# Patient Record
Sex: MALE | Race: WHITE | NOT HISPANIC OR LATINO | Employment: UNEMPLOYED | URBAN - NONMETROPOLITAN AREA
[De-identification: names, ages, dates, MRNs, and addresses within clinical notes are randomized per-mention and may not be internally consistent; named-entity substitution may affect disease eponyms.]

---
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
## 2021-02-01 ENCOUNTER — APPOINTMENT (OUTPATIENT)
Dept: GENERAL RADIOLOGY | Facility: HOSPITAL | Age: 21
End: 2021-02-01

## 2021-02-01 ENCOUNTER — APPOINTMENT (OUTPATIENT)
Dept: CT IMAGING | Facility: HOSPITAL | Age: 21
End: 2021-02-01

## 2021-02-01 ENCOUNTER — HOSPITAL ENCOUNTER (EMERGENCY)
Facility: HOSPITAL | Age: 21
Discharge: HOME | End: 2021-02-01
Attending: FAMILY MEDICINE | Admitting: FAMILY MEDICINE

## 2021-02-01 VITALS
WEIGHT: 245 LBS | HEART RATE: 90 BPM | HEIGHT: 69 IN | RESPIRATION RATE: 20 BRPM | TEMPERATURE: 98.4 F | DIASTOLIC BLOOD PRESSURE: 77 MMHG | SYSTOLIC BLOOD PRESSURE: 119 MMHG | BODY MASS INDEX: 36.29 KG/M2 | OXYGEN SATURATION: 100 %

## 2021-02-01 DIAGNOSIS — U07.1 COVID-19: Primary | ICD-10-CM

## 2021-02-01 DIAGNOSIS — J18.9 PNEUMONIA DUE TO INFECTIOUS ORGANISM, UNSPECIFIED LATERALITY, UNSPECIFIED PART OF LUNG: ICD-10-CM

## 2021-02-01 LAB
ALBUMIN SERPL-MCNC: 4.47 G/DL (ref 3.5–5.2)
ALBUMIN/GLOB SERPL: 1.3 G/DL
ALP SERPL-CCNC: 98 U/L (ref 39–117)
ALT SERPL W P-5'-P-CCNC: 54 U/L (ref 1–41)
ANION GAP SERPL CALCULATED.3IONS-SCNC: 8.8 MMOL/L (ref 5–15)
AST SERPL-CCNC: 10 U/L (ref 1–40)
BASOPHILS # BLD AUTO: 0.06 10*3/MM3 (ref 0–0.2)
BASOPHILS NFR BLD AUTO: 1.5 % (ref 0–1.5)
BILIRUB SERPL-MCNC: 0.3 MG/DL (ref 0–1.2)
BUN SERPL-MCNC: 9 MG/DL (ref 6–20)
BUN/CREAT SERPL: 10.2 (ref 7–25)
CALCIUM SPEC-SCNC: 9.1 MG/DL (ref 8.6–10.5)
CHLORIDE SERPL-SCNC: 101 MMOL/L (ref 98–107)
CO2 SERPL-SCNC: 28.2 MMOL/L (ref 22–29)
CREAT SERPL-MCNC: 0.88 MG/DL (ref 0.76–1.27)
CRP SERPL-MCNC: <0.3 MG/DL (ref 0–0.5)
D DIMER PPP FEU-MCNC: 0.51 MCGFEU/ML (ref 0–0.5)
D-LACTATE SERPL-SCNC: 0.9 MMOL/L (ref 0.5–2)
DEPRECATED RDW RBC AUTO: 39.5 FL (ref 37–54)
EOSINOPHIL # BLD AUTO: 0.22 10*3/MM3 (ref 0–0.4)
EOSINOPHIL NFR BLD AUTO: 5.4 % (ref 0.3–6.2)
ERYTHROCYTE [DISTWIDTH] IN BLOOD BY AUTOMATED COUNT: 11.9 % (ref 12.3–15.4)
FERRITIN SERPL-MCNC: 202.3 NG/ML (ref 30–400)
FLUAV RNA RESP QL NAA+PROBE: NOT DETECTED
FLUBV RNA RESP QL NAA+PROBE: NOT DETECTED
GFR SERPL CREATININE-BSD FRML MDRD: 112 ML/MIN/1.73
GLOBULIN UR ELPH-MCNC: 3.4 GM/DL
GLUCOSE SERPL-MCNC: 98 MG/DL (ref 65–99)
HCT VFR BLD AUTO: 52.9 % (ref 37.5–51)
HGB BLD-MCNC: 18.2 G/DL (ref 13–17.7)
HOLD SPECIMEN: NORMAL
HOLD SPECIMEN: NORMAL
IMM GRANULOCYTES # BLD AUTO: 0.01 10*3/MM3 (ref 0–0.05)
IMM GRANULOCYTES NFR BLD AUTO: 0.2 % (ref 0–0.5)
INR PPP: 0.94 (ref 0.9–1.1)
LDH SERPL-CCNC: 304 U/L (ref 135–225)
LYMPHOCYTES # BLD AUTO: 1.88 10*3/MM3 (ref 0.7–3.1)
LYMPHOCYTES NFR BLD AUTO: 46.3 % (ref 19.6–45.3)
MCH RBC QN AUTO: 30.7 PG (ref 26.6–33)
MCHC RBC AUTO-ENTMCNC: 34.4 G/DL (ref 31.5–35.7)
MCV RBC AUTO: 89.2 FL (ref 79–97)
MONOCYTES # BLD AUTO: 0.75 10*3/MM3 (ref 0.1–0.9)
MONOCYTES NFR BLD AUTO: 18.5 % (ref 5–12)
NEUTROPHILS NFR BLD AUTO: 1.14 10*3/MM3 (ref 1.7–7)
NEUTROPHILS NFR BLD AUTO: 28.1 % (ref 42.7–76)
NRBC BLD AUTO-RTO: 0 /100 WBC (ref 0–0.2)
NT-PROBNP SERPL-MCNC: <5 PG/ML (ref 0–450)
PLATELET # BLD AUTO: 298 10*3/MM3 (ref 140–450)
PMV BLD AUTO: 9.5 FL (ref 6–12)
POTASSIUM SERPL-SCNC: 4.1 MMOL/L (ref 3.5–5.2)
PROT SERPL-MCNC: 7.9 G/DL (ref 6–8.5)
PROTHROMBIN TIME: 12.3 SECONDS (ref 11.9–14.1)
QT INTERVAL: 352 MS
QTC INTERVAL: 432 MS
RBC # BLD AUTO: 5.93 10*6/MM3 (ref 4.14–5.8)
SARS-COV-2 RNA RESP QL NAA+PROBE: DETECTED
SODIUM SERPL-SCNC: 138 MMOL/L (ref 136–145)
TROPONIN T SERPL-MCNC: <0.01 NG/ML (ref 0–0.03)
WBC # BLD AUTO: 4.06 10*3/MM3 (ref 3.4–10.8)
WHOLE BLOOD HOLD SPECIMEN: NORMAL
WHOLE BLOOD HOLD SPECIMEN: NORMAL

## 2021-02-01 PROCEDURE — 85379 FIBRIN DEGRADATION QUANT: CPT | Performed by: FAMILY MEDICINE

## 2021-02-01 PROCEDURE — 25010000002 IOPAMIDOL 61 % SOLUTION: Performed by: FAMILY MEDICINE

## 2021-02-01 PROCEDURE — 86140 C-REACTIVE PROTEIN: CPT | Performed by: FAMILY MEDICINE

## 2021-02-01 PROCEDURE — 83605 ASSAY OF LACTIC ACID: CPT | Performed by: FAMILY MEDICINE

## 2021-02-01 PROCEDURE — 85025 COMPLETE CBC W/AUTO DIFF WBC: CPT | Performed by: FAMILY MEDICINE

## 2021-02-01 PROCEDURE — 82728 ASSAY OF FERRITIN: CPT | Performed by: FAMILY MEDICINE

## 2021-02-01 PROCEDURE — 83615 LACTATE (LD) (LDH) ENZYME: CPT | Performed by: FAMILY MEDICINE

## 2021-02-01 PROCEDURE — 93010 ELECTROCARDIOGRAM REPORT: CPT | Performed by: INTERNAL MEDICINE

## 2021-02-01 PROCEDURE — 71275 CT ANGIOGRAPHY CHEST: CPT

## 2021-02-01 PROCEDURE — 80053 COMPREHEN METABOLIC PANEL: CPT | Performed by: FAMILY MEDICINE

## 2021-02-01 PROCEDURE — 93005 ELECTROCARDIOGRAM TRACING: CPT | Performed by: FAMILY MEDICINE

## 2021-02-01 PROCEDURE — 84484 ASSAY OF TROPONIN QUANT: CPT | Performed by: FAMILY MEDICINE

## 2021-02-01 PROCEDURE — 99284 EMERGENCY DEPT VISIT MOD MDM: CPT

## 2021-02-01 PROCEDURE — 85610 PROTHROMBIN TIME: CPT | Performed by: FAMILY MEDICINE

## 2021-02-01 PROCEDURE — 87636 SARSCOV2 & INF A&B AMP PRB: CPT | Performed by: FAMILY MEDICINE

## 2021-02-01 PROCEDURE — 71045 X-RAY EXAM CHEST 1 VIEW: CPT | Performed by: RADIOLOGY

## 2021-02-01 PROCEDURE — 71045 X-RAY EXAM CHEST 1 VIEW: CPT

## 2021-02-01 PROCEDURE — 71275 CT ANGIOGRAPHY CHEST: CPT | Performed by: RADIOLOGY

## 2021-02-01 PROCEDURE — 83880 ASSAY OF NATRIURETIC PEPTIDE: CPT | Performed by: FAMILY MEDICINE

## 2021-02-01 RX ORDER — SODIUM CHLORIDE 0.9 % (FLUSH) 0.9 %
10 SYRINGE (ML) INJECTION AS NEEDED
Status: DISCONTINUED | OUTPATIENT
Start: 2021-02-01 | End: 2021-02-01 | Stop reason: HOSPADM

## 2021-02-01 RX ORDER — CEFDINIR 300 MG/1
300 CAPSULE ORAL 2 TIMES DAILY
Qty: 14 CAPSULE | Refills: 0 | Status: SHIPPED | OUTPATIENT
Start: 2021-02-01

## 2021-02-01 RX ORDER — DOXYCYCLINE 100 MG/1
100 CAPSULE ORAL 2 TIMES DAILY
Qty: 20 CAPSULE | Refills: 0 | Status: SHIPPED | OUTPATIENT
Start: 2021-02-01

## 2021-02-01 RX ADMIN — IOPAMIDOL 80 ML: 612 INJECTION, SOLUTION INTRAVENOUS at 13:24

## 2021-02-01 NOTE — ED NOTES
Pt gave verbal consent for CT with contrast due to isolation precautions, consent witnessed by CHRISTI Tovar.      Renita Ariza, RN  02/01/21 6424

## 2021-02-01 NOTE — ED NOTES
Pt d/c'd all questions answered no acute distress noted skin warm pink and dry      Cassie Gonzalez RN  02/01/21 6284

## 2021-02-01 NOTE — ED PROVIDER NOTES
Subjective   20-year-old male with a history of anxiety presents to the emergency room with URI symptoms.  Patient states he had a cough for the past couple days.  He states cough is nonproductive.  He states he had sick contacts through coworkers who had coronavirus as well as flu.  He states he had subjective fever which is taken over-the-counter medications for.  He denies nausea vomiting.  He states he had loss of taste.  Reports generalized body aches as well as a headache.  Denies neck pain.  He denies focal weakness numbness.  Denies abdominal pain denies chest pain.  Due to ongoing symptoms came to emergency for evaluation.      Cough  Cough characteristics:  Non-productive  Sputum characteristics:  Nondescript  Timing:  Constant  Chronicity:  New  Relieved by:  Nothing  Worsened by:  Nothing  Associated symptoms: chills, fever, headaches and myalgias    Associated symptoms: no chest pain, no diaphoresis, no rash, no rhinorrhea, no shortness of breath, no sinus congestion, no sore throat and no wheezing        Review of Systems   Constitutional: Positive for chills and fever. Negative for diaphoresis.   HENT: Negative for rhinorrhea and sore throat.    Respiratory: Positive for cough. Negative for shortness of breath and wheezing.    Cardiovascular: Negative for chest pain.   Musculoskeletal: Positive for myalgias.   Skin: Negative for rash.   Neurological: Positive for headaches.   All other systems reviewed and are negative.      Past Medical History:   Diagnosis Date   • ADHD (attention deficit hyperactivity disorder)    • Anxiety    • Bipolar disorder (CMS/HCC)    • Depression    • Mood disorder (CMS/HCC)    • Oppositional defiant disorder    • Psychiatric illness        No Known Allergies    History reviewed. No pertinent surgical history.    Family History   Problem Relation Age of Onset   • Anxiety disorder Mother    • ADD / ADHD Mother    • Depression Maternal Grandfather        Social History      Socioeconomic History   • Marital status: Single     Spouse name: Not on file   • Number of children: Not on file   • Years of education: Not on file   • Highest education level: Not on file   Tobacco Use   • Smoking status: Never Smoker   • Smokeless tobacco: Never Used   Substance and Sexual Activity   • Alcohol use: No   • Drug use: No   • Sexual activity: Never           Objective   Physical Exam  Vitals signs and nursing note reviewed.   Constitutional:       Appearance: He is not ill-appearing or diaphoretic.   HENT:      Head: Normocephalic and atraumatic.      Comments: Moist mucous membranes uvula midline.  No oral lesions.  Clear tympanic membranes no mastoid tenderness.  Eyes:      Conjunctiva/sclera: Conjunctivae normal.      Pupils: Pupils are equal, round, and reactive to light.   Neck:      Musculoskeletal: Neck supple.      Comments: No nuchal rigidity  Cardiovascular:      Rate and Rhythm: Normal rate and regular rhythm.      Comments: No murmur 2+ radial pulse 2+ dorsalis pedis pulses no systoles  Pulmonary:      Comments: Symmetric chest wall movement no excess muscle use speaks in full sentences no rhonchi rales or wheezes.  Abdominal:      General: Bowel sounds are normal.      Palpations: Abdomen is soft.      Tenderness: There is no abdominal tenderness. There is no guarding.      Comments: No guarding rigidity no abdominal bruit.   Musculoskeletal: Normal range of motion.   Skin:     General: Skin is warm and dry.      Capillary Refill: Capillary refill takes less than 2 seconds.   Neurological:      Mental Status: He is alert and oriented to person, place, and time.      Cranial Nerves: No cranial nerve deficit.      Sensory: No sensory deficit.   Psychiatric:         Mood and Affect: Mood normal.         Procedures           ED Course  ED Course as of Feb 01 1413   Mon Feb 01, 2021   1045 Patient's white blood cell count unremarkable hemoglobin 18.2 hematocrit 52.9 patient did have  elevated D-dimer at 0.51 have ordered CT PE protocol.  EKG shows normal sinus rhythm rate 91 parable 162 QRS 84  normal axis no ST elevation.    [BB]   1103 Patient Covid positive.    [BB]   1109 CMP is unremarkable LDH elevated 304 CRP unremarkable troponin ferritin unremarkable.    [BB]   1110 Chest x-ray is unremarkable    [BB]   1411 Patient CT PE protocol negative for PE however is noted patchy infiltrate patient to be started on oral antibiotics.  I discussed need for follow-up with family doctor as needed discussed warning signs symptoms that warrant return to emergency room.  Patient is afebrile vital signs are stable.  He is in no respiratory distress.  Have discussed quarantine for 14 days    [BB]      ED Course User Index  [BB] Cliff Brennan MD                                           [facility]    Final diagnoses:   COVID-19   Pneumonia due to infectious organism, unspecified laterality, unspecified part of lung            Cliff Brennan MD  02/01/21 1300

## 2021-02-01 NOTE — DISCHARGE INSTRUCTIONS
Call one of the offices below to establish a primary care provider.  If you are unable to get an appointment and feel it is an emergency and need to be seen immediately please return to the Emergency Department.    Call one of the office below to set up a primary care provider.    Dr. Emmanuel Carnes                                                                                                       [address] [facility] [address]  [phone]    Dr. Daniel, Dr. DEANDRE Lafleur, Dr. BOBBY Lafleur ([facility])  [address] [facility] [address]  [phone]    Dr. Wallace, Dr. Burks, Dr. Augustin ([facility])  [address] [facility] [address]  [phone]    Dr. Kapadia  [address] [facility] [address]  [phone]    Dr. Snyder, Dr. Wong, Dr. Tsang, Dr. Diane ([facility])  [address] [place] [address] [address]  [facility] [address]  [phone]    Dr. Nighat Keller  [address] [facility] [place] [address]  [phone]    Dr. Karla Heck  [address]   [address]  [facility] [address]  [phone]    Dr. Carnes  [address] [facility] [address]  [phone]    Dr. Nath, Dr. Hurley  [address] [facility] [place] [address]  [phone]    Dr. Mark  [address]                                                              [address]  [facility] [address]  [phone]    Dr. Lauren  [address] [facility] [id number] [phone]    Dr. Zohreh Levine  [address]  [address]  [facility] [address]  [phone]

## 2021-02-03 ENCOUNTER — PATIENT OUTREACH (OUTPATIENT)
Dept: CASE MANAGEMENT | Facility: OTHER | Age: 21
End: 2021-02-03

## 2021-02-03 NOTE — OUTREACH NOTE
"ED Potential Covid Discharge Follow-up    Patient was seen in the ED 2/1/21 with complaints of cough and loss of taste. Patient tested positive for Covid-19. Patient and his sister live together and were both diagnosed with the virus. Patient had been prescribed cefdinir and doxycyline but states these were provided to him on paper prescriptions and he does not have anyone to fill these for him as he is now self-quarantined. RN-ACM asked if patient's local health department had contacted him yet, patient states \"They sent our names to the wrong HD.\" Patient states he lives in [facility]. ACM encouraged patient to contact the [facility] and let them know he needs assistance in filling/obtaining his prescribed medications as the  has been arranging this for patients in self-quarantine as needed, pt v/u. ACM instructed on 24/7 nurse line as well as Covid Hotline number, pt v/u.     Nanette Loja RN  Ambulatory     2/3/2021, 10:37 EST      "

## 2021-03-15 ENCOUNTER — BULK ORDERING (OUTPATIENT)
Dept: CASE MANAGEMENT | Facility: OTHER | Age: 21
End: 2021-03-15

## 2021-03-15 DIAGNOSIS — Z23 IMMUNIZATION DUE: ICD-10-CM

## 2024-01-23 ENCOUNTER — HOSPITAL ENCOUNTER (EMERGENCY)
Facility: HOSPITAL | Age: 24
Discharge: HOME | End: 2024-01-23
Attending: EMERGENCY MEDICINE | Admitting: EMERGENCY MEDICINE
Payer: COMMERCIAL

## 2024-01-23 VITALS
HEIGHT: 69 IN | SYSTOLIC BLOOD PRESSURE: 141 MMHG | TEMPERATURE: 98.1 F | HEART RATE: 72 BPM | WEIGHT: 232 LBS | OXYGEN SATURATION: 99 % | RESPIRATION RATE: 18 BRPM | DIASTOLIC BLOOD PRESSURE: 95 MMHG | BODY MASS INDEX: 34.36 KG/M2

## 2024-01-23 DIAGNOSIS — L02.811 CELLULITIS AND ABSCESS OF HEAD: Primary | ICD-10-CM

## 2024-01-23 DIAGNOSIS — L03.811 CELLULITIS AND ABSCESS OF HEAD: Primary | ICD-10-CM

## 2024-01-23 PROCEDURE — 99283 EMERGENCY DEPT VISIT LOW MDM: CPT

## 2024-01-23 RX ORDER — SULFAMETHOXAZOLE AND TRIMETHOPRIM 800; 160 MG/1; MG/1
2 TABLET ORAL ONCE
Status: COMPLETED | OUTPATIENT
Start: 2024-01-23 | End: 2024-01-23

## 2024-01-23 RX ORDER — SULFAMETHOXAZOLE AND TRIMETHOPRIM 800; 160 MG/1; MG/1
2 TABLET ORAL 2 TIMES DAILY
Qty: 40 TABLET | Refills: 0 | Status: SHIPPED | OUTPATIENT
Start: 2024-01-23

## 2024-01-23 RX ADMIN — SULFAMETHOXAZOLE AND TRIMETHOPRIM 2 TABLET: 800; 160 TABLET ORAL at 05:36

## 2024-01-23 NOTE — DISCHARGE INSTRUCTIONS
Antibiotics as prescribed.  Drink plenty of water with this antibiotic.  Warm moist compresses frequently throughout the day as we discussed.  Call patient connection [facility] today to schedule first available follow-up visit with primary care.  Return to the emergency department right away if symptoms worsen/any problems.

## 2024-01-23 NOTE — ED PROVIDER NOTES
"Subjective   History of Present Illness  Patient is a 23-year-old male who presents complaining of a swollen red area on the right side of his face, on his cheek.  He states that this started Wednesday of last week, seem to get better but seems to be worsening now once again.  He denies any injury.  He denies any other symptoms or other complaints.  Patient has a psychiatric history and formerly was on some psychiatric medications, including Abilify, Depakote, guanfacine and trazodone.  He states that he quit taking all of this \"a good while ago\", states that he has been doing well and not having any psychiatric issues.  He denies any suicidal thoughts, homicidal thoughts, auditory or visual hallucinations.      Review of Systems   All other systems reviewed and are negative.      Past Medical History:   Diagnosis Date    ADHD (attention deficit hyperactivity disorder)     Anxiety     Bipolar disorder     Depression     Mood disorder     Oppositional defiant disorder     Psychiatric illness        No Known Allergies    History reviewed. No pertinent surgical history.    Family History   Problem Relation Age of Onset    Anxiety disorder Mother     ADD / ADHD Mother     Depression Maternal Grandfather        Social History     Socioeconomic History    Marital status: Single   Tobacco Use    Smoking status: Never    Smokeless tobacco: Never   Substance and Sexual Activity    Alcohol use: No    Drug use: No    Sexual activity: Never           Objective   Physical Exam  Vitals and nursing note reviewed.   Constitutional:       General: He is not in acute distress.     Appearance: Normal appearance. He is well-developed. He is not ill-appearing, toxic-appearing or diaphoretic.      Comments: Well-developed well-nourished male, alert and well-oriented, in no apparent acute distress.   HENT:      Head: Normocephalic and atraumatic.      Comments: Right maxillary skin has an approximately 1 to 1-1/2 x 3 cm of redness and " induration.  At this point this does not require incision and drainage.  Eyes:      General: No scleral icterus.     Pupils: Pupils are equal, round, and reactive to light.   Neck:      Trachea: No tracheal deviation.   Cardiovascular:      Rate and Rhythm: Normal rate and regular rhythm.   Pulmonary:      Effort: Pulmonary effort is normal. No respiratory distress.      Breath sounds: Normal breath sounds.   Chest:      Chest wall: No tenderness.   Abdominal:      General: Bowel sounds are normal.      Palpations: Abdomen is soft.      Tenderness: There is no abdominal tenderness. There is no guarding or rebound.   Musculoskeletal:         General: No tenderness. Normal range of motion.      Cervical back: Normal range of motion and neck supple. No rigidity or tenderness.      Right lower leg: No edema.      Left lower leg: No edema.   Skin:     General: Skin is warm and dry.      Capillary Refill: Capillary refill takes less than 2 seconds.      Coloration: Skin is not pale.   Neurological:      General: No focal deficit present.      Mental Status: He is alert and oriented to person, place, and time.      GCS: GCS eye subscore is 4. GCS verbal subscore is 5. GCS motor subscore is 6.      Motor: No abnormal muscle tone.   Psychiatric:         Mood and Affect: Mood normal.         Behavior: Behavior normal.         Procedures           ED Course  ED Course as of 01/23/24 0559   Tue Jan 23, 2024   0543 Patient and I discussed his situation.  We are going to treat this with antibiotics and warm moist compresses.  I would prefer not to have to incise and drain an area like this on his face.  He is in agreement with this.  He will return to the emergency department immediately if his symptoms worsen or he has any problems.  He states that he does not have a primary care provider, will refer him to [facility] to establish primary care. [CM]      ED Course User Index  [CM] Marcell Echevarria MD                                              Medical Decision Making  Risk  Prescription drug management.        Final diagnoses:   Cellulitis and abscess of head       ED Disposition  ED Disposition       ED Disposition   Discharge    Condition   Stable    Comment   --               PATIENT CONNECTION - [facility]  See Provider List  [place] [address]  [phone]  Go to   Call this morning to schedule first available follow-up visit with primary care    [facility] EMERGENCY DEPARTMENT  [address] [facility] [id number] [phone]  Go to   If symptoms worsen         Medication List        New Prescriptions      sulfamethoxazole-trimethoprim 800-160 MG per tablet  Commonly known as: BACTRIM DS,SEPTRA DS  Take 2 tablets by mouth 2 (Two) Times a Day.            Stop      cefdinir 300 MG capsule  Commonly known as: OMNICEF     doxycycline 100 MG capsule  Commonly known as: MONODOX               Where to Get Your Medications        You can get these medications from any pharmacy    Bring a paper prescription for each of these medications  sulfamethoxazole-trimethoprim 800-160 MG per tablet       Please note that portions of this note were completed with a voice recognition program.        Marcell Echevarria MD  01/23/24 3006

## 2025-04-03 NOTE — PLAN OF CARE
11:15 AM    Reason for Call: OVERBOOK    Patient is having the following symptoms: hospital ([facility]) follow up needed for around April 11th, please call back with date and time.  Thank you    The patient is requesting an appointment for (see above)    Was an appointment offered for this call? Yes  If yes : Appointment type              Date    Preferred method for responding to this message: Telephone Call    What is your phone number [phone]     If we cannot reach you directly, may we leave a detailed response at the number you provided? Yes    Can this message wait until your PCP/provider returns, if unavailable today? Not applicable, provider is in    [place]    Problem:  Patient Care Overview (Adult)  Goal: Plan of Care Review  Outcome: Ongoing (interventions implemented as appropriate)   02/16/18 1352   Coping/Psychosocial Response Interventions   Plan Of Care Reviewed With patient   Coping/Psychosocial   Patient Agreement with Plan of Care agrees   Patient Care Overview   Progress improving   Outcome Evaluation   Outcome Summary/Follow up Plan Attended groups and unit activities except school this am due to getting to unit at 0300. Following unit rules       Problem:  Overarching Goals  Goal: Adheres to Safety Considerations for Self and Others  Outcome: Ongoing (interventions implemented as appropriate)    Goal: Optimized Coping Skills in Response to Life Stressors  Outcome: Ongoing (interventions implemented as appropriate)    Goal: Develops/Participates in Therapeutic [place] to Support Successful Transition  Outcome: Ongoing (interventions implemented as appropriate)